# Patient Record
(demographics unavailable — no encounter records)

---

## 2025-03-31 NOTE — PHYSICAL EXAM
[] : motor exam is 5/5 throughout both lower extremities with normal tone [FreeTextEntry8] : L periscapular pain
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
## 2025-03-31 NOTE — DISCUSSION/SUMMARY
[Medication Risks Reviewed] : Medication risks reviewed [de-identified] : reviewed the case and the imaging with the patient  total spine pain  discussion of the condition and treatment options cautions discussed questions answered discussion of natural history of the condition and what the next step would be MRi T spine   fu to review the mri  for the shoulder pains he can see the shoulder service   this may be neck related - may be a candidate for acdf but would look at the middle back  referral to the PM here for [facility]   fu to review the MRI

## 2025-05-08 NOTE — DISCUSSION/SUMMARY
[de-identified] : ANDREAS BURLESON is a 37 year-old man presenting for a NPV for a history of chronic neck and upper back pain.     Plan: 1) MRI cervical spine images reviewed with the patient. 2) MRI thoracic spine images reviewed with the patient. 3) MRI lumbar spine images reviewed with the patient.

## 2025-05-08 NOTE — DISCUSSION/SUMMARY
[de-identified] : ANDREAS BURLESON is a 37 year-old man presenting for a NPV for a history of chronic neck and upper back pain.     Plan: 1) MRI cervical spine images reviewed with the patient. 2) MRI thoracic spine images reviewed with the patient. 3) MRI lumbar spine images reviewed with the patient.

## 2025-05-08 NOTE — HISTORY OF PRESENT ILLNESS
[FreeTextEntry1] : 5/6/2025: ANDREAS BURLESON is a 37 year-old man presenting for a NPV for a history of chronic neck and upper back pain.    The patient states that average pain over the past week was /10 in severity. Mood: Sleep:   Prior treatment:

## 2025-05-08 NOTE — DATA REVIEWED
[Cervical Spine] : cervical spine [Lumbar Spine] : lumbar spine [MRI] : MRI [Thoracic Spine] : thoracic spine [Report was reviewed and noted in the chart] : The report was reviewed and noted in the chart [I independently reviewed and interpreted images and report] : I independently reviewed and interpreted images and report [FreeTextEntry1] : 4/16/2025 MRI Thoracic Spine O&C T8-T9: small right paracentral protrusion and annular tear abutting the RIGHT cord  Mild multifocal anterior osteophytic spurring w/ mid thoracic disc space narrowing and facet arthropathy  7/30/2024 MRI Cervical Spine ([facility]) Findings: There is reversal of the cervical lordosis. Vertebral bodies are normal in height without a fracture. Signal arising from bone marrow does not demonstrate a focal osseous lesion or infiltrative process.  Limited evaluation of the posterior fossa is unremarkable. No abnormal signal is seen in the cervical spinal cord.  Craniocervical junctions are maintained.  At C2-3, There is no disc protrusion. There is no thecal sac compression or foraminal compromise. Facet joints are preserved.  At C3-4, There is mild disc desiccation without a disc protrusion. There is no facet arthrosis. There is no thecal sac compression. There is no foraminal stenosis.  At C4-5, There is a small circumferential disc bulge. There is right foraminal disc herniation and uncovertebral hypertrophy. There is no facet arthrosis. There is mild thecal sac compression. There is moderate right foraminal stenosis with abutment of the exiting nerve root.  At C5-6, there is a left foraminal disc protrusion, axial image 18. There is mild disc bulging. There is no facet arthrosis. There is no thecal sac compression. There is moderate left foraminal stenosis with abutment of exiting nerve root.  At C6-7, there are bilateral foraminal disc herniations, sagittal images 3 and 13. Like disc bulge. There is no facet arthrosis. There is no thecal sac compression. There is moderate to severe bilateral foraminal stenosis. There is mild impingement of exiting nerve roots.  At C7-T1, there is a small disc bulge. There is no facet arthrosis. There is no thecal sac compression. There is mild bilateral foraminal stenosis.  The upper thoracic levels are unremarkable.  Paraspinal musculature is within normal limits. There is no bulky lymphadenopathy.   IMPRESSION:   At C4-C5 there is a right foraminal disc herniation and uncovertebral hypertrophy. There is moderate right foraminal stenosis.  At C5-C6 there is a left foraminal disc protrusion and moderate left foraminal stenosis.  At C6-C7 there are bilateral foraminal disc herniations and a circumferential disc bulge. There is moderate to severe bilateral foraminal stenosis.  7/30/2024 MRI Lumbar Spine ([facility]) Findings: There is straightening of the lumbar lordosis. Vertebral bodies are normal in height without a fracture or pars defect. Signal arising from bone marrow does not demonstrate an aggressive osseous lesion or infiltrative process. The lower cord and conus are normal in signal and morphology.  At L1-2, There is no focal disc protrusion, thecal sac compression or foraminal compromise. Facet joints are preserved.  At L2-3, There is no focal disc protrusion, thecal sac compression or foraminal compromise. Facet joints are preserved.  At L3-4, There is a small circumferential disc bulge. There is no facet arthrosis. There is mild thecal sac compression. There is mild bilateral foraminal stenosis.  At L4-5, There is no focal disc protrusion, thecal sac compression or foraminal compromise. Facet joints are preserved.  At L5-S1, There is no focal disc protrusion, thecal sac compression or foraminal compromise. Facet joints are preserved.  Paraspinal musculature is symmetric. There is no bulky lymphadenopathy.  IMPRESSION:   At L3-L4 there is a small disc bulge and mild thecal sac compression. There is no focal disc protrusion. There is straightening of the lumbar lordosis.